# Patient Record
Sex: MALE | Race: WHITE | NOT HISPANIC OR LATINO | URBAN - METROPOLITAN AREA
[De-identification: names, ages, dates, MRNs, and addresses within clinical notes are randomized per-mention and may not be internally consistent; named-entity substitution may affect disease eponyms.]

---

## 2021-06-25 ENCOUNTER — OFFICE VISIT (OUTPATIENT)
Dept: URBAN - METROPOLITAN AREA CLINIC 73 | Facility: CLINIC | Age: 71
End: 2021-06-25
Payer: MEDICARE

## 2021-06-25 DIAGNOSIS — Z96.1 PRESENCE OF INTRAOCULAR LENS: ICD-10-CM

## 2021-06-25 DIAGNOSIS — H43.813 VITREOUS DEGENERATION, BILATERAL: Primary | ICD-10-CM

## 2021-06-25 PROCEDURE — 99204 OFFICE O/P NEW MOD 45 MIN: CPT | Performed by: OPHTHALMOLOGY

## 2021-06-25 PROCEDURE — 92134 CPTRZ OPH DX IMG PST SGM RTA: CPT | Performed by: OPHTHALMOLOGY

## 2021-06-25 ASSESSMENT — INTRAOCULAR PRESSURE
OS: 16
OD: 17

## 2021-06-25 NOTE — IMPRESSION/PLAN
Impression: Vitreous degeneration, bilateral: H43.813.
new onset OS May 2021 OCT OU = no SRF/IRF OU  /250 Plan: No retinal breaks were identified on exam.  The findings were discussed with the patient. The signs and symptoms of a retinal tear and detachment were again reviewed with the patient. The patient was advised to return if they noted any new floaters, flashing lights or a shadow in their vision. Rec yearly dilated exam with us or primary eye care provider 12m OCT OU